# Patient Record
Sex: FEMALE | Race: WHITE | NOT HISPANIC OR LATINO | Employment: UNEMPLOYED | URBAN - NONMETROPOLITAN AREA
[De-identification: names, ages, dates, MRNs, and addresses within clinical notes are randomized per-mention and may not be internally consistent; named-entity substitution may affect disease eponyms.]

---

## 2024-09-16 ENCOUNTER — HOSPITAL ENCOUNTER (EMERGENCY)
Facility: HOSPITAL | Age: 29
Discharge: HOME | End: 2024-09-16
Attending: FAMILY MEDICINE
Payer: COMMERCIAL

## 2024-09-16 VITALS
HEIGHT: 65 IN | HEART RATE: 79 BPM | BODY MASS INDEX: 26.66 KG/M2 | RESPIRATION RATE: 16 BRPM | SYSTOLIC BLOOD PRESSURE: 121 MMHG | TEMPERATURE: 97.9 F | OXYGEN SATURATION: 99 % | DIASTOLIC BLOOD PRESSURE: 79 MMHG | WEIGHT: 160 LBS

## 2024-09-16 DIAGNOSIS — K08.409 STATUS POST TOOTH EXTRACTION: ICD-10-CM

## 2024-09-16 DIAGNOSIS — Z32.02 NEGATIVE PREGNANCY TEST: ICD-10-CM

## 2024-09-16 DIAGNOSIS — K08.89 TOOTHACHE: Primary | ICD-10-CM

## 2024-09-16 LAB — HCG UR QL IA.RAPID: NEGATIVE

## 2024-09-16 PROCEDURE — 81025 URINE PREGNANCY TEST: CPT | Performed by: FAMILY MEDICINE

## 2024-09-16 PROCEDURE — 2500000001 HC RX 250 WO HCPCS SELF ADMINISTERED DRUGS (ALT 637 FOR MEDICARE OP): Mod: SE | Performed by: FAMILY MEDICINE

## 2024-09-16 PROCEDURE — 99283 EMERGENCY DEPT VISIT LOW MDM: CPT

## 2024-09-16 RX ORDER — HYDROCODONE BITARTRATE AND ACETAMINOPHEN 5; 325 MG/1; MG/1
1 TABLET ORAL ONCE
Status: COMPLETED | OUTPATIENT
Start: 2024-09-16 | End: 2024-09-16

## 2024-09-16 ASSESSMENT — PAIN SCALES - GENERAL: PAINLEVEL_OUTOF10: 10 - WORST POSSIBLE PAIN

## 2024-09-16 ASSESSMENT — COLUMBIA-SUICIDE SEVERITY RATING SCALE - C-SSRS
2. HAVE YOU ACTUALLY HAD ANY THOUGHTS OF KILLING YOURSELF?: NO
6. HAVE YOU EVER DONE ANYTHING, STARTED TO DO ANYTHING, OR PREPARED TO DO ANYTHING TO END YOUR LIFE?: NO
1. IN THE PAST MONTH, HAVE YOU WISHED YOU WERE DEAD OR WISHED YOU COULD GO TO SLEEP AND NOT WAKE UP?: NO

## 2024-09-16 ASSESSMENT — PAIN DESCRIPTION - PAIN TYPE: TYPE: ACUTE PAIN

## 2024-09-16 ASSESSMENT — PAIN DESCRIPTION - LOCATION: LOCATION: TEETH

## 2024-09-16 ASSESSMENT — PAIN - FUNCTIONAL ASSESSMENT: PAIN_FUNCTIONAL_ASSESSMENT: 0-10

## 2024-09-16 NOTE — ED PROVIDER NOTES
HPI   Chief Complaint   Patient presents with    Dental Pain     Pt got multiple teeth pulled at [facility] on Friday and is having a lot of pain since, states it is hot and she now has a black eye. Pt states the extraction was hard and took longer than expected. They discharged her on 800mg ibuprofen and antibiotics.        Pregnancy test was obtained which was negative as expected.  I did not see any evidence of any abscess        This 29-year-old female patient came to emergency room with complaint of toothache, history of multiple teeth extracted 3 days ago teeth extraction at least 2 in the left upper premolar molar region as she is willing to stay given that she taking ibuprofen prescribed oral dentist as well as antibiotic.  She did not call the dentist yet.  She denies any facial swelling  Throat tightness difficult swallowing difficulty breathing.  Patient's boyfriend has been doing most of the talking as she has minimal contribution of the history.  Patient is currently here  AND  her pregnancy test was negative prior to tooth extraction few days ago.  Patient feels bloated and patient and her boyfriend wanted a pregnancy test done.  Denies any abdominal pain.  Denies any back pain chest pain or short of breath.  Denies any difficulty swallowing difficulty breathing.  Denies any neck stiffness.      Social history: Reviewed     family history: Reviewed  Review of system: 10 review of system obtained review of system as In HPI otherwise negative    Patient History   No past medical history on file.  No past surgical history on file.  No family history on file.  Social History     Tobacco Use    Smoking status: Not on file    Smokeless tobacco: Not on file   Substance Use Topics    Alcohol use: Not on file    Drug use: Not on file       Physical Exam   ED Triage Vitals [09/16/24 0552]   Temperature Heart Rate Respirations BP   36.6 °C (97.9 °F) 81 16 120/83      Pulse Ox Temp Source Heart  Rate Source Patient Position   97 % Temporal Monitor Sitting      BP Location FiO2 (%)     Left arm --       Physical Exam  Constitutional:       Appearance: Normal appearance.      Comments: Patient did not look sick toxic distress no facial edema erythema empties area as left upper premolar and molar area noted from recently his extraction no gum edema Sherry no abscess noted.  I did not see any facial edema hemothorax given tach ear clear neck is supple no extremity dyspnea.  Patient with talking breathing comfortably did not look sick toxic distress.  Alert oriented x 3.  Well-developed well-nourished well-kept.   HENT:      Head: Normocephalic and atraumatic.      Right Ear: External ear normal.      Left Ear: External ear normal.      Nose: Nose normal.      Mouth/Throat:      Mouth: Mucous membranes are moist.   Eyes:      Extraocular Movements: Extraocular movements intact.      Conjunctiva/sclera: Conjunctivae normal.      Pupils: Pupils are equal, round, and reactive to light.   Neck:      Vascular: No carotid bruit.   Cardiovascular:      Rate and Rhythm: Normal rate and regular rhythm.      Pulses: Normal pulses.      Heart sounds: Normal heart sounds. No murmur heard.     No friction rub. No gallop.      Comments: No JVD.  Good peripheral pulses no peripheral edema regular rate and rhythm.  Good skin perfusion.  Pulmonary:      Effort: Pulmonary effort is normal. No respiratory distress.      Breath sounds: Normal breath sounds. No stridor. No rales.      Comments: Clear breath sound bilateral wheezing rales or rhonchi no tachypnea hypoxemia respiratory breathing comfortably.  Calf muscle nontender Homans' sign negative intact distal pulse intact sensation.  Abdominal:      General: Abdomen is flat. Bowel sounds are normal.      Palpations: Abdomen is soft.      Comments: Abdomen mildly obese soft positive bowel sound nontender no guarding rebound surgery.  No CVA tenderness noted.   Musculoskeletal:          General: No swelling, tenderness, deformity or signs of injury. Normal range of motion.      Cervical back: Normal range of motion and neck supple. No rigidity or tenderness.      Right lower leg: No edema.      Left lower leg: No edema.   Lymphadenopathy:      Cervical: No cervical adenopathy.   Skin:     General: Skin is warm.      Comments: Normal skin turgor no cyanosis.  Good skin perfusion   Neurological:      General: No focal deficit present.      Mental Status: She is alert and oriented to person, place, and time. Mental status is at baseline.      Deep Tendon Reflexes: Reflexes normal.   Psychiatric:         Mood and Affect: Mood normal.         Behavior: Behavior normal.           ED Course & MDM   Diagnoses as of 09/16/24 0816   Toothache   Status post tooth extraction   Negative pregnancy test   This 29-year-old female patient came to emergency room with complaint of toothache, history of multiple teeth extracted 3 days ago teeth extraction at least 2 in the left upper premolar molar region as she is willing to stay given that she taking ibuprofen prescribed oral dentist as well as antibiotic.  She did not call the dentist yet.  She denies any facial swelling  Throat tightness difficult swallowing difficulty breathing.  Patient's boyfriend has been doing most of the talking as she has minimal contribution of the history.  Patient is currently here  AND  her pregnancy test was negative prior to tooth extraction few days ago.  Patient feels bloated and patient and her boyfriend wanted a pregnancy test done.  Denies any abdominal pain.  Denies any back pain chest pain or short of breath.  Denies any difficulty swallowing difficulty breathing.  Denies any neck stiffness.       Patient did not look sick toxic distress no facial edema erythema empties area as left upper premolar and molar area noted from recently his extraction no gum edema erythema, no abscess noted.  I did not see any facial edema  hemothorax given tach ear clear neck is supple no extremity dyspnea.  Patient with talking breathing comfortably did not look sick toxic distress.  Alert oriented x 3.  Well-developed well-nourished well-kept.  Lungs clear heart regular rate and rhythm vascular abdomen soft nontender.  I did not find any evidence of facial edema Sherry no abscess no, tooth abnormality except recently extracted teeth.             No data recorded                                 Medical Decision Making  Patient's pregnancy test negative.  I did not find any evidence of any abscess edema of the face or teeth.  No tenderness upon palpation, teeth.  Will give her 1 Vicodin p.o. for pain or discomfort however advised that she should continue to antibiotic and ibuprofen call her dentist for follow-up.  If any problem concerning return to ER no narcotic prescription issued.    Procedure  Procedures     Norman Mathis MD  09/16/24 0888

## 2024-09-16 NOTE — DISCHARGE INSTRUCTIONS
Your urine pregnancy test negative.  There was no clinical evidence of abscess close.  Continue antibiotic and ibuprofen follow-up with your dentist and primary care physician.  If any problem concern return to ER

## 2024-10-01 ENCOUNTER — APPOINTMENT (OUTPATIENT)
Dept: PRIMARY CARE | Facility: CLINIC | Age: 29
End: 2024-10-01
Payer: COMMERCIAL

## 2024-10-13 ENCOUNTER — HOSPITAL ENCOUNTER (EMERGENCY)
Facility: HOSPITAL | Age: 29
Discharge: HOME | End: 2024-10-13
Attending: EMERGENCY MEDICINE
Payer: COMMERCIAL

## 2024-10-13 ENCOUNTER — APPOINTMENT (OUTPATIENT)
Dept: RADIOLOGY | Facility: HOSPITAL | Age: 29
End: 2024-10-13
Payer: COMMERCIAL

## 2024-10-13 VITALS
WEIGHT: 156 LBS | BODY MASS INDEX: 25.99 KG/M2 | RESPIRATION RATE: 18 BRPM | TEMPERATURE: 97.3 F | DIASTOLIC BLOOD PRESSURE: 82 MMHG | HEIGHT: 65 IN | SYSTOLIC BLOOD PRESSURE: 111 MMHG | OXYGEN SATURATION: 100 % | HEART RATE: 73 BPM

## 2024-10-13 DIAGNOSIS — Z34.90 INTRAUTERINE PREGNANCY (HHS-HCC): Primary | ICD-10-CM

## 2024-10-13 DIAGNOSIS — Z3A.01 LESS THAN 8 WEEKS GESTATION OF PREGNANCY (HHS-HCC): ICD-10-CM

## 2024-10-13 DIAGNOSIS — R10.9 ABDOMINAL PAIN DURING PREGNANCY IN FIRST TRIMESTER (HHS-HCC): ICD-10-CM

## 2024-10-13 DIAGNOSIS — O26.891 ABDOMINAL PAIN DURING PREGNANCY IN FIRST TRIMESTER (HHS-HCC): ICD-10-CM

## 2024-10-13 LAB
ALBUMIN SERPL BCP-MCNC: 4.5 G/DL (ref 3.4–5)
ALP SERPL-CCNC: 51 U/L (ref 33–110)
ALT SERPL W P-5'-P-CCNC: 13 U/L (ref 7–45)
ANION GAP SERPL CALC-SCNC: 12 MMOL/L (ref 10–20)
APPEARANCE UR: ABNORMAL
AST SERPL W P-5'-P-CCNC: 13 U/L (ref 9–39)
B-HCG SERPL-ACNC: 7231 MIU/ML
BACTERIA #/AREA URNS AUTO: ABNORMAL /HPF
BASOPHILS # BLD AUTO: 0.02 X10*3/UL (ref 0–0.1)
BASOPHILS NFR BLD AUTO: 0.3 %
BILIRUB SERPL-MCNC: 0.4 MG/DL (ref 0–1.2)
BILIRUB UR STRIP.AUTO-MCNC: NEGATIVE MG/DL
BUN SERPL-MCNC: 10 MG/DL (ref 6–23)
CALCIUM SERPL-MCNC: 8.8 MG/DL (ref 8.6–10.3)
CHLORIDE SERPL-SCNC: 103 MMOL/L (ref 98–107)
CO2 SERPL-SCNC: 26 MMOL/L (ref 21–32)
COLOR UR: COLORLESS
CREAT SERPL-MCNC: 0.81 MG/DL (ref 0.5–1.05)
EGFRCR SERPLBLD CKD-EPI 2021: >90 ML/MIN/1.73M*2
EOSINOPHIL # BLD AUTO: 0.17 X10*3/UL (ref 0–0.7)
EOSINOPHIL NFR BLD AUTO: 2.5 %
ERYTHROCYTE [DISTWIDTH] IN BLOOD BY AUTOMATED COUNT: 13.6 % (ref 11.5–14.5)
GLUCOSE SERPL-MCNC: 98 MG/DL (ref 74–99)
GLUCOSE UR STRIP.AUTO-MCNC: NORMAL MG/DL
HCG UR QL IA.RAPID: POSITIVE
HCT VFR BLD AUTO: 42.7 % (ref 36–46)
HGB BLD-MCNC: 14 G/DL (ref 12–16)
IMM GRANULOCYTES # BLD AUTO: 0.02 X10*3/UL (ref 0–0.7)
IMM GRANULOCYTES NFR BLD AUTO: 0.3 % (ref 0–0.9)
KETONES UR STRIP.AUTO-MCNC: NEGATIVE MG/DL
LACTATE SERPL-SCNC: 0.6 MMOL/L (ref 0.4–2)
LEUKOCYTE ESTERASE UR QL STRIP.AUTO: ABNORMAL
LIPASE SERPL-CCNC: 17 U/L (ref 9–82)
LYMPHOCYTES # BLD AUTO: 1.57 X10*3/UL (ref 1.2–4.8)
LYMPHOCYTES NFR BLD AUTO: 22.8 %
MCH RBC QN AUTO: 30.6 PG (ref 26–34)
MCHC RBC AUTO-ENTMCNC: 32.8 G/DL (ref 32–36)
MCV RBC AUTO: 93 FL (ref 80–100)
MONOCYTES # BLD AUTO: 0.34 X10*3/UL (ref 0.1–1)
MONOCYTES NFR BLD AUTO: 4.9 %
NEUTROPHILS # BLD AUTO: 4.78 X10*3/UL (ref 1.2–7.7)
NEUTROPHILS NFR BLD AUTO: 69.2 %
NITRITE UR QL STRIP.AUTO: NEGATIVE
NRBC BLD-RTO: 0 /100 WBCS (ref 0–0)
PH UR STRIP.AUTO: 6.5 [PH]
PLATELET # BLD AUTO: 357 X10*3/UL (ref 150–450)
POTASSIUM SERPL-SCNC: 3.4 MMOL/L (ref 3.5–5.3)
PROT SERPL-MCNC: 7.2 G/DL (ref 6.4–8.2)
PROT UR STRIP.AUTO-MCNC: NEGATIVE MG/DL
RBC # BLD AUTO: 4.57 X10*6/UL (ref 4–5.2)
RBC # UR STRIP.AUTO: NEGATIVE /UL
RBC #/AREA URNS AUTO: ABNORMAL /HPF
SODIUM SERPL-SCNC: 138 MMOL/L (ref 136–145)
SP GR UR STRIP.AUTO: 1
SQUAMOUS #/AREA URNS AUTO: ABNORMAL /HPF
UROBILINOGEN UR STRIP.AUTO-MCNC: NORMAL MG/DL
WBC # BLD AUTO: 6.9 X10*3/UL (ref 4.4–11.3)
WBC #/AREA URNS AUTO: ABNORMAL /HPF

## 2024-10-13 PROCEDURE — 76801 OB US < 14 WKS SINGLE FETUS: CPT

## 2024-10-13 PROCEDURE — 76801 OB US < 14 WKS SINGLE FETUS: CPT | Performed by: RADIOLOGY

## 2024-10-13 PROCEDURE — 84702 CHORIONIC GONADOTROPIN TEST: CPT | Performed by: PHYSICIAN ASSISTANT

## 2024-10-13 PROCEDURE — 36415 COLL VENOUS BLD VENIPUNCTURE: CPT | Performed by: PHYSICIAN ASSISTANT

## 2024-10-13 PROCEDURE — 36415 COLL VENOUS BLD VENIPUNCTURE: CPT | Performed by: EMERGENCY MEDICINE

## 2024-10-13 PROCEDURE — 81001 URINALYSIS AUTO W/SCOPE: CPT | Performed by: EMERGENCY MEDICINE

## 2024-10-13 PROCEDURE — 83605 ASSAY OF LACTIC ACID: CPT | Performed by: PHYSICIAN ASSISTANT

## 2024-10-13 PROCEDURE — 81025 URINE PREGNANCY TEST: CPT | Performed by: EMERGENCY MEDICINE

## 2024-10-13 PROCEDURE — 85025 COMPLETE CBC W/AUTO DIFF WBC: CPT | Performed by: EMERGENCY MEDICINE

## 2024-10-13 PROCEDURE — 99284 EMERGENCY DEPT VISIT MOD MDM: CPT | Mod: 25

## 2024-10-13 PROCEDURE — 83690 ASSAY OF LIPASE: CPT | Performed by: EMERGENCY MEDICINE

## 2024-10-13 PROCEDURE — 80053 COMPREHEN METABOLIC PANEL: CPT | Performed by: EMERGENCY MEDICINE

## 2024-10-13 PROCEDURE — 87086 URINE CULTURE/COLONY COUNT: CPT | Mod: GENLAB | Performed by: EMERGENCY MEDICINE

## 2024-10-13 PROCEDURE — 76817 TRANSVAGINAL US OBSTETRIC: CPT | Performed by: RADIOLOGY

## 2024-10-13 RX ORDER — ONDANSETRON 4 MG/1
4 TABLET, ORALLY DISINTEGRATING ORAL EVERY 8 HOURS PRN
Qty: 20 TABLET | Refills: 0 | Status: SHIPPED | OUTPATIENT
Start: 2024-10-13 | End: 2024-10-20

## 2024-10-13 ASSESSMENT — PAIN DESCRIPTION - LOCATION: LOCATION: ABDOMEN

## 2024-10-13 ASSESSMENT — PAIN SCALES - GENERAL: PAINLEVEL_OUTOF10: 9

## 2024-10-13 ASSESSMENT — COLUMBIA-SUICIDE SEVERITY RATING SCALE - C-SSRS
1. IN THE PAST MONTH, HAVE YOU WISHED YOU WERE DEAD OR WISHED YOU COULD GO TO SLEEP AND NOT WAKE UP?: NO
2. HAVE YOU ACTUALLY HAD ANY THOUGHTS OF KILLING YOURSELF?: NO
6. HAVE YOU EVER DONE ANYTHING, STARTED TO DO ANYTHING, OR PREPARED TO DO ANYTHING TO END YOUR LIFE?: NO

## 2024-10-13 ASSESSMENT — PAIN DESCRIPTION - ORIENTATION: ORIENTATION: MID

## 2024-10-13 ASSESSMENT — PAIN - FUNCTIONAL ASSESSMENT: PAIN_FUNCTIONAL_ASSESSMENT: 0-10

## 2024-10-13 ASSESSMENT — PAIN DESCRIPTION - PAIN TYPE: TYPE: ACUTE PAIN

## 2024-10-13 NOTE — ED PROVIDER NOTES
HPI   Chief Complaint   Patient presents with    Abdominal Pain     Pt reports she was dx pregnant 4 days ago and she has been to [facility] twice and has had ultrasounds done and they still can't determine how far along she is, they also told her she has  UMBILICAL hernia, she reports upper abdominal pain that radiates to her navel. Vomiting in the morning x 1 today. A smear am of blood one time yesterday when she wiped, denies any urinary symptoms        History of present illness:  29-year-old female presents to the emergency room for complaints of diffuse abdominal cramping, nausea and vomiting and possible pregnancy.  The patient states that she began having symptoms 4 days ago.  She states that she went to another emergency room 2 days ago and was evaluated was told that she was pregnant.  They performed an ultrasound that time but advised her of the uterus appeared empty but they did not find any obvious signs of a ectopic pregnancy.  She was discharged home and told to follow-up with OB.  She states she has an appointment at the end of this month with her OB/GYN.  She states that she is .  She states that the previous 3 pregnancies were carried to full-term with no difficulty.  She has no other symptoms at this time denies any fevers or chills or chest pain or shortness of breath.    Social history: Negative for alcohol and drug use.    Review of systems:   Gen.: No weight loss, fatigue, anorexia, insomnia, fever.   Eyes: No vision loss, double vision, drainage, eye pain.   ENT: No pharyngitis, dry mouth.   Cardiac: No chest pain, palpitations, syncope, near syncope.   Pulmonary: No shortness of breath, cough, hemoptysis.   Heme/lymph: No swollen glands, fever, bleeding.   GI: No change in bowel habits, melena, hematemesis, hematochezia, diarrhea.   : No discharge, dysuria, frequency, urgency, hematuria.   Musculoskeletal: No limb pain, joint pain, joint swelling.   Skin: No rashes.   Review of systems is  otherwise negative unless stated above or in history of present illness.        Physical exam:  General: Vitals noted, no distress. Afebrile.   EENT: No lymphadenopathy appreciated  Cardiac: Regular, rate, rhythm, no murmur.   Pulmonary: Lungs clear bilaterally with good aeration. No adventitious breath sounds.   Abdomen: Soft, nonsurgical.  No peritoneal signs. Normoactive bowel sounds.  Tenderness to palpation across the upper abdomen as well as the lower abdomen, no obvious signs of hernias present or masses or abscess  Extremities: No peripheral edema.   Skin: No rash.   Neuro: No focal neurologic deficits          Medical decision making:   Testing: CBC was unremarkable CMP showed potassium 3.4, beta quantitative hCG was 7231 last 1 was at 1600 taken 2 days ago.  Lactate 0.6 lipase at 17, ultrasound of the pelvis ordered: Ultrasound of the pelvis shows intrauterine pregnancy at 5 weeks and no other acute findings  Treatment: Zofran IV given  Reevaluation:   Plan: Home-going.  Discussed differential. Will follow-up with the primary physician in the next 2-3 days. Return if worse. They understand return precautions and discharge instructions. Patient and family/friend/caregiver are in agreement with this plan. 29-year-old female presents to the emergency room for complaints of diffuse abdominal cramping, nausea and vomiting and possible pregnancy.  The patient states that she began having symptoms 4 days ago.  She states that she went to another emergency room 2 days ago and was evaluated was told that she was pregnant.  They performed an ultrasound that time but advised her of the uterus appeared empty but they did not find any obvious signs of a ectopic pregnancy.  She was discharged home and told to follow-up with OB.  She states she has an appointment at the end of this month with her OB/GYN.  She states that she is .  She states that the previous 3 pregnancies were carried to full-term with no  difficulty.  She has no other symptoms at this time denies any fevers or chills or chest pain or shortness of breath.  On physical exam the patient complains of diffuse tenderness to palpation across the abdomen particularly epigastric area.  Bowel sounds are normal throughout there is no hernia appreciated on exam.  The patient does not appear to be in acute distress at this time.  The patient was given Zofran IV which alleviated her nausea at this time.  She was requesting to eat something but explained to her that would like to wait for the ultrasound return prior to her eating.  Ultrasound results were as above.  I explained to the patient and to her  at bedside that be sending him home at this time a short course of Zofran.  I encouraged him to please follow-up closely with the OB/GYN next week as prior planned.   Impression:   1.  Pregnancy in the first trimester   2. Morning sickness          History provided by:  Patient   used: No            Patient History   History reviewed. No pertinent past medical history.  History reviewed. No pertinent surgical history.  No family history on file.  Social History     Tobacco Use    Smoking status: Some Days     Current packs/day: 0.50     Types: Cigarettes    Smokeless tobacco: Never   Vaping Use    Vaping status: Never Used   Substance Use Topics    Alcohol use: Not Currently    Drug use: Not Currently       Physical Exam   ED Triage Vitals [10/13/24 1049]   Temperature Heart Rate Respirations BP   36.3 °C (97.4 °F) 89 18 (!) 121/92      Pulse Ox Temp Source Heart Rate Source Patient Position   99 % Oral Monitor --      BP Location FiO2 (%)     -- --       Physical Exam      ED Course & MDM   Diagnoses as of 10/13/24 1707   Intrauterine pregnancy ([facility])   Less than 8 weeks gestation of pregnancy ([facility])   Abdominal pain during pregnancy in first trimester ([facility])                 No data recorded                                  Medical Decision Making      Procedure  Procedures     Levi Ruby PA-C  10/13/24 8208

## 2024-10-14 LAB — BACTERIA UR CULT: NORMAL

## 2025-06-15 ENCOUNTER — APPOINTMENT (OUTPATIENT)
Dept: URGENT CARE | Facility: URGENT CARE | Age: 30
End: 2025-06-15
Payer: COMMERCIAL